# Patient Record
Sex: MALE | URBAN - METROPOLITAN AREA
[De-identification: names, ages, dates, MRNs, and addresses within clinical notes are randomized per-mention and may not be internally consistent; named-entity substitution may affect disease eponyms.]

---

## 2019-05-02 ENCOUNTER — OFFICE VISIT (OUTPATIENT)
Dept: URBAN - METROPOLITAN AREA CLINIC 40 | Facility: CLINIC | Age: 69
End: 2019-05-02
Payer: MEDICARE

## 2019-05-02 DIAGNOSIS — H25.13 AGE-RELATED NUCLEAR CATARACT, BILATERAL: Primary | ICD-10-CM

## 2019-05-02 PROCEDURE — 99204 OFFICE O/P NEW MOD 45 MIN: CPT | Performed by: OPTOMETRIST

## 2019-05-02 ASSESSMENT — INTRAOCULAR PRESSURE
OD: 15
OS: 15

## 2019-05-02 ASSESSMENT — KERATOMETRY
OS: 43.13
OD: 43.00

## 2019-05-03 ENCOUNTER — OFFICE VISIT (OUTPATIENT)
Dept: URBAN - METROPOLITAN AREA CLINIC 40 | Facility: CLINIC | Age: 69
End: 2019-05-03
Payer: COMMERCIAL

## 2019-05-03 DIAGNOSIS — H52.4 PRESBYOPIA: Primary | ICD-10-CM

## 2019-05-03 PROCEDURE — 92015 DETERMINE REFRACTIVE STATE: CPT | Performed by: OPTOMETRIST

## 2019-05-03 PROCEDURE — 92014 COMPRE OPH EXAM EST PT 1/>: CPT | Performed by: OPTOMETRIST

## 2019-05-03 ASSESSMENT — VISUAL ACUITY
OD: 20/25
OS: 20/25

## 2019-05-08 ENCOUNTER — OFFICE VISIT (OUTPATIENT)
Dept: URBAN - METROPOLITAN AREA CLINIC 40 | Facility: CLINIC | Age: 69
End: 2019-05-08
Payer: MEDICARE

## 2019-05-08 DIAGNOSIS — H43.811 VITREOUS DEGENERATION, RIGHT EYE: Primary | ICD-10-CM

## 2019-05-08 PROCEDURE — 92014 COMPRE OPH EXAM EST PT 1/>: CPT | Performed by: OPTOMETRIST

## 2019-05-08 ASSESSMENT — INTRAOCULAR PRESSURE
OS: 11
OD: 11

## 2020-02-24 ENCOUNTER — OFFICE VISIT (OUTPATIENT)
Dept: URBAN - METROPOLITAN AREA CLINIC 40 | Facility: CLINIC | Age: 70
End: 2020-02-24
Payer: MEDICARE

## 2020-02-24 DIAGNOSIS — H43.813 VITREOUS DEGENERATION, BILATERAL: Primary | ICD-10-CM

## 2020-02-24 PROCEDURE — 92014 COMPRE OPH EXAM EST PT 1/>: CPT | Performed by: OPTOMETRIST

## 2020-02-24 ASSESSMENT — INTRAOCULAR PRESSURE
OD: 12
OS: 12

## 2020-02-24 NOTE — IMPRESSION/PLAN
Impression: Vitreous degeneration, bilateral: H43.813. Plan: Posterior vitreous detachment accounts for the patient's complaints. There is no evidence of retinal pathology. All signs and risks of retinal detachment and tears were discussed in detail. Patient instructed to call the office immediately if any symptoms noted. Recommend the patient return to office for follow up. Schedule refraction.